# Patient Record
Sex: MALE | Race: WHITE | NOT HISPANIC OR LATINO | Employment: PART TIME | URBAN - METROPOLITAN AREA
[De-identification: names, ages, dates, MRNs, and addresses within clinical notes are randomized per-mention and may not be internally consistent; named-entity substitution may affect disease eponyms.]

---

## 2022-09-29 ENCOUNTER — OFFICE VISIT (OUTPATIENT)
Dept: FAMILY MEDICINE | Facility: CLINIC | Age: 42
End: 2022-09-29
Payer: MEDICARE

## 2022-09-29 VITALS — HEART RATE: 87 BPM | DIASTOLIC BLOOD PRESSURE: 78 MMHG | SYSTOLIC BLOOD PRESSURE: 133 MMHG | OXYGEN SATURATION: 99 %

## 2022-09-29 DIAGNOSIS — L03.115 CELLULITIS OF RIGHT LEG: Primary | ICD-10-CM

## 2022-09-29 PROCEDURE — 99213 PR OFFICE/OUTPT VISIT, EST, LEVL III, 20-29 MIN: ICD-10-PCS | Mod: S$GLB,,, | Performed by: INTERNAL MEDICINE

## 2022-09-29 PROCEDURE — 99213 OFFICE O/P EST LOW 20 MIN: CPT | Mod: S$GLB,,, | Performed by: INTERNAL MEDICINE

## 2022-09-29 RX ORDER — HYDROCHLOROTHIAZIDE 25 MG/1
TABLET ORAL
COMMUNITY
Start: 2022-07-07

## 2022-09-29 RX ORDER — ALLOPURINOL 300 MG/1
300 TABLET ORAL
COMMUNITY

## 2022-09-29 RX ORDER — MONTELUKAST SODIUM 10 MG/1
TABLET ORAL
COMMUNITY
Start: 2022-09-18

## 2022-09-29 RX ORDER — LEVOCETIRIZINE DIHYDROCHLORIDE 5 MG/1
5 TABLET, FILM COATED ORAL
COMMUNITY

## 2022-09-29 RX ORDER — OLMESARTAN MEDOXOMIL 40 MG/1
TABLET ORAL
COMMUNITY
Start: 2022-09-04

## 2022-09-29 RX ORDER — LINEZOLID 600 MG/1
600 TABLET, FILM COATED ORAL EVERY 12 HOURS
Qty: 20 TABLET | Refills: 0 | Status: SHIPPED | OUTPATIENT
Start: 2022-09-29 | End: 2022-10-09

## 2022-09-29 RX ORDER — LEVOTHYROXINE SODIUM 75 UG/1
75 TABLET ORAL
COMMUNITY

## 2022-09-29 RX ORDER — SEMAGLUTIDE 1.34 MG/ML
INJECTION, SOLUTION SUBCUTANEOUS
COMMUNITY
Start: 2022-09-02

## 2022-09-29 RX ORDER — ELECTROLYTES/DEXTROSE
5000 SOLUTION, ORAL ORAL
COMMUNITY

## 2022-09-29 NOTE — PROGRESS NOTES
Subjective:       Patient ID: Mateus Rivers is a 42 y.o. male.    Chief Complaint: Referral (Staphylococcus aureus infection)    Here for follow up from recent hospital stay.  He was there for refractory cellulitis to the right lower extremity.  He was initially on zosyn but failed to respond so zyvox was added.  He responded well to this along with increased elevation of the RLE.  He was able to be discharged home and has done well since discharge.  No fever or chills at home.  Swelling and redness continue to improve.  Still has a small open areas on the posterior side of the RLE but this is improving as well.      Has been 100% compliant with antibiotics, no adverse effects noted.  No acute issues at this time.  Review of Systems   Constitutional:  Negative for chills and fever.   Respiratory:  Negative for cough and shortness of breath.    Cardiovascular:  Negative for chest pain and palpitations.   Gastrointestinal:  Negative for diarrhea, nausea and vomiting.   Skin:  Positive for wound.   Neurological:  Positive for headaches (occasional, improving).     Objective:      Physical Exam  Vitals and nursing note reviewed.   Constitutional:       Appearance: Normal appearance. He is well-developed.   HENT:      Right Ear: External ear normal.      Left Ear: External ear normal.   Eyes:      General: No scleral icterus.     Conjunctiva/sclera: Conjunctivae normal.   Cardiovascular:      Rate and Rhythm: Normal rate and regular rhythm.      Heart sounds: Normal heart sounds. No murmur heard.  Pulmonary:      Effort: Pulmonary effort is normal. No respiratory distress.      Breath sounds: Normal breath sounds. No wheezing.   Abdominal:      General: Bowel sounds are normal. There is no distension.      Palpations: Abdomen is soft.      Tenderness: There is no abdominal tenderness. There is no guarding.   Skin:     General: Skin is warm and dry.      Findings: Lesion (wound to posterior RLE dressed) present. No rash.       Comments: Erythema improved, still trace-1+ edema RLE   Neurological:      Mental Status: He is alert and oriented to person, place, and time. Mental status is at baseline.   Psychiatric:         Mood and Affect: Mood normal.         Behavior: Behavior normal.       Assessment:       1. Cellulitis of right leg          Plan:       Problem List Items Addressed This Visit          ID    Cellulitis of right leg - Primary     Continues to do well on zyvox post hospital discharge.  Will extend therapy for another 10 days and then reassess.  Continue dermatology follow up and local care.           Relevant Medications    linezolid (ZYVOX) 600 mg Tab

## 2022-09-29 NOTE — ASSESSMENT & PLAN NOTE
Continues to do well on zyvox post hospital discharge.  Will extend therapy for another 10 days and then reassess.  Continue dermatology follow up and local care.

## 2022-10-03 ENCOUNTER — TELEPHONE (OUTPATIENT)
Dept: FAMILY MEDICINE | Facility: CLINIC | Age: 42
End: 2022-10-03
Payer: MEDICARE

## 2022-10-03 NOTE — TELEPHONE ENCOUNTER
----- Message from Jessica Tipton MA sent at 10/3/2022  7:41 AM CDT -----  Contact: pt    ----- Message -----  From: Natalya Skinner  Sent: 9/30/2022   2:06 PM CDT  To: Aleyda Fang Staff        Who Called:stephania Worley Left Message for Patient:  Does the patient know what this is regarding?:r/s appt   Would the patient rather a call back or a response via [facility]?   Best Call Back Number:.[phone]    Additional Information:

## 2022-10-12 ENCOUNTER — OFFICE VISIT (OUTPATIENT)
Dept: INFECTIOUS DISEASES | Facility: CLINIC | Age: 42
End: 2022-10-12
Payer: MEDICARE

## 2022-10-12 VITALS
HEART RATE: 95 BPM | SYSTOLIC BLOOD PRESSURE: 126 MMHG | BODY MASS INDEX: 38.36 KG/M2 | WEIGHT: 315 LBS | OXYGEN SATURATION: 98 % | RESPIRATION RATE: 18 BRPM | DIASTOLIC BLOOD PRESSURE: 63 MMHG | HEIGHT: 76 IN

## 2022-10-12 DIAGNOSIS — I87.2 VENOUS INSUFFICIENCY OF LOWER EXTREMITY, UNSPECIFIED LATERALITY: ICD-10-CM

## 2022-10-12 DIAGNOSIS — L03.115 CELLULITIS OF RIGHT LEG: Primary | ICD-10-CM

## 2022-10-12 PROCEDURE — 99213 OFFICE O/P EST LOW 20 MIN: CPT | Mod: S$GLB,,, | Performed by: NURSE PRACTITIONER

## 2022-10-12 PROCEDURE — 99213 PR OFFICE/OUTPT VISIT, EST, LEVL III, 20-29 MIN: ICD-10-PCS | Mod: S$GLB,,, | Performed by: NURSE PRACTITIONER

## 2022-10-12 RX ORDER — LINEZOLID 600 MG/1
600 TABLET, FILM COATED ORAL EVERY 12 HOURS
Qty: 8 TABLET | Refills: 0 | Status: SHIPPED | OUTPATIENT
Start: 2022-10-12 | End: 2022-10-16

## 2022-10-12 NOTE — PROGRESS NOTES
Infectious Diseases Clinic Note    Subjective:       Patient ID: Mateus Rivers is a 42 y.o. male     Chief Complaint: No chief complaint on file.        Mateus Rivers is a 42 y.o. male here today for 2 week f/u regarding cellulitis.  This is a new diagnosis to me.  Referral from   .   Primary care provider is Edson Negron MD .      Here for follow up from recent hospital stay. He is accompanied by his mother.  He was there for refractory cellulitis to the right lower extremity.  He was initially on zosyn but failed to respond so zyvox was added.  He responded well to this along with increased elevation of the RLE.  He was able to be discharged home and has done well since discharge. He saw Dr. Brett camargo two weeks ago. His zyvox was extended by 10 days. He has two days left of medication. He continues to have erythema of the LE.  No fever or chills at home.  Swelling continue to improve.  His small open areas on the posterior side of the RLE have scabbed over. He tells me he is not wearing compression hose.      Has been 100% compliant with antibiotics, no adverse effects noted.  No acute issues at this time.             Past Medical History:   Diagnosis Date    Cellulitis of right lower extremity     GERD (gastroesophageal reflux disease)     HLD (hyperlipidemia)     Staphylococcus aureus infection        Social History     Socioeconomic History    Marital status: Single   Tobacco Use    Smoking status: Never    Smokeless tobacco: Never   Substance and Sexual Activity    Alcohol use: Never    Drug use: Never         Current Outpatient Medications:     allopurinoL (ZYLOPRIM) 300 MG tablet, 300 mg., Disp: , Rfl:     biotin 5 mg Cap, 5,000 mcg., Disp: , Rfl:     calcium carbonate (TUMS) 300 mg (750 mg) Chew, 750 mg., Disp: , Rfl:     hydroCHLOROthiazide (HYDRODIURIL) 25 MG tablet, , Disp: , Rfl:     levocetirizine (XYZAL) 5 MG tablet, 5 mg., Disp: , Rfl:     levothyroxine (SYNTHROID) 75 MCG tablet, 75 mcg.,  Disp: , Rfl:     linezolid (ZYVOX) 600 mg Tab, Take 1 tablet (600 mg total) by mouth every 12 (twelve) hours. for 4 days, Disp: 8 tablet, Rfl: 0    montelukast (SINGULAIR) 10 mg tablet, , Disp: , Rfl:     olmesartan (BENICAR) 40 MG tablet, , Disp: , Rfl:     OZEMPIC 1 mg/dose (4 mg/3 mL), INJECT ONE MG SUBCUTANEOUSLY ONCE WEEKLY, Disp: , Rfl:     Review of Systems   Constitutional:  Negative for chills and fever.   Respiratory:  Negative for cough and shortness of breath.    Cardiovascular:  Positive for leg swelling (RLE). Negative for chest pain and palpitations.   Gastrointestinal:  Negative for diarrhea, nausea and vomiting.   Genitourinary:  Negative for difficulty urinating.   Skin:  Negative for wound.   Neurological:  Negative for headaches (occasional, improving).         Objective:      Vitals:    10/12/22 1310   BP: 126/63   Pulse: 95   Resp: 18     Physical Exam  Vitals and nursing note reviewed.   Constitutional:       Appearance: Normal appearance. He is well-developed.   HENT:      Right Ear: External ear normal.      Left Ear: External ear normal.   Eyes:      General: No scleral icterus.     Conjunctiva/sclera: Conjunctivae normal.   Cardiovascular:      Rate and Rhythm: Normal rate and regular rhythm.      Heart sounds: Normal heart sounds. No murmur heard.  Pulmonary:      Effort: Pulmonary effort is normal. No respiratory distress.      Breath sounds: Normal breath sounds. No wheezing.   Abdominal:      General: Bowel sounds are normal. There is no distension.      Palpations: Abdomen is soft.      Tenderness: There is no abdominal tenderness. There is no guarding.   Skin:     General: Skin is warm and dry.      Findings: No lesion or rash.             Comments: Erythema improved, still trace-1+ edema RLE   Neurological:      Mental Status: He is alert and oriented to person, place, and time. Mental status is at baseline.   Psychiatric:         Mood and Affect: Mood normal.         Behavior:  Behavior normal.           Assessment/Plan:       1. Cellulitis of right leg    2. Venous insufficiency of lower extremity, unspecified laterality      Problem List Items Addressed This Visit          ID    Cellulitis of right leg - Primary    Current Assessment & Plan     Cellulitis seems to be complicated by venous insufficiency as he is not wearing compression hose around the house. Would like for him to continue Zyvox until next week with dedication to wearing the compression hose.. his wound is scabbed over at this time, so no contraindication to compression.  add Compression stockings to lower extremity to promote venous return.          Relevant Medications    linezolid (ZYVOX) 600 mg Tab     Other Visit Diagnoses       Venous insufficiency of lower extremity, unspecified laterality               No visits with results within 1 Month(s) from this visit.   Latest known visit with results is:   No results found for any previous visit.      No results found in the last 30 days.      Duration of encounter: 15 minutes  This includes face-to-face time and non face-to-face time preparing to see the patient (eg, review of tests), obtaining and/or reviewing separately obtained history, documenting clinical information in the electronic or other health record, independently interpreting resultsand communicating results to the patient/family/caregiver, or care coordination.      All diagnostic data (labs/imaging) was reviewed with the patient and/or family member in the room.  All questions were answered to their liking. The patient and/or family member voiced understanding of all instructions provided. Expectations regarding follow up and treatment plan were voiced and confirmed prior to departure. The patient was given orders/instructions at the end of the visit for reference. They were instructed to notify my office if they have not been contacted for imaging/referrals/labs/results in 1-2 weeks. They voiced  understanding of all of the above.     Follow up:     There are no Patient Instructions on file for this visit.     Follow up in about 6 days (around 10/18/2022), or if symptoms worsen or fail to improve.

## 2022-10-18 ENCOUNTER — OFFICE VISIT (OUTPATIENT)
Dept: INFECTIOUS DISEASES | Facility: CLINIC | Age: 42
End: 2022-10-18
Payer: MEDICARE

## 2022-10-18 VITALS
WEIGHT: 315 LBS | BODY MASS INDEX: 38.36 KG/M2 | HEIGHT: 76 IN | HEART RATE: 105 BPM | SYSTOLIC BLOOD PRESSURE: 116 MMHG | TEMPERATURE: 98 F | DIASTOLIC BLOOD PRESSURE: 78 MMHG | OXYGEN SATURATION: 95 %

## 2022-10-18 DIAGNOSIS — L03.115 CELLULITIS OF RIGHT LEG: Primary | ICD-10-CM

## 2022-10-18 PROCEDURE — 99213 OFFICE O/P EST LOW 20 MIN: CPT | Mod: S$GLB,,, | Performed by: NURSE PRACTITIONER

## 2022-10-18 PROCEDURE — 99213 PR OFFICE/OUTPT VISIT, EST, LEVL III, 20-29 MIN: ICD-10-PCS | Mod: S$GLB,,, | Performed by: NURSE PRACTITIONER

## 2022-10-18 RX ORDER — OMEGA-3 FATTY ACIDS 1000 MG
1000 CAPSULE ORAL DAILY
COMMUNITY

## 2022-10-18 RX ORDER — GENTAMICIN SULFATE 1 MG/G
1 OINTMENT TOPICAL DAILY
COMMUNITY

## 2022-10-18 RX ORDER — NAPROXEN 500 MG/1
500 TABLET ORAL
COMMUNITY

## 2022-10-18 RX ORDER — DOXYCYCLINE 100 MG/1
100 CAPSULE ORAL DAILY
COMMUNITY
Start: 2022-09-02

## 2022-10-18 RX ORDER — RISANKIZUMAB-RZAA 150 MG/ML
150 INJECTION SUBCUTANEOUS
COMMUNITY
Start: 2022-10-14

## 2022-10-18 RX ORDER — ATORVASTATIN CALCIUM 80 MG/1
80 TABLET, FILM COATED ORAL DAILY
COMMUNITY

## 2022-10-18 RX ORDER — LINEZOLID 600 MG/1
600 TABLET, FILM COATED ORAL 2 TIMES DAILY
COMMUNITY
Start: 2022-09-20

## 2022-10-18 RX ORDER — PANTOPRAZOLE SODIUM 40 MG/1
40 TABLET, DELAYED RELEASE ORAL DAILY
COMMUNITY
Start: 2022-10-09

## 2022-10-18 NOTE — ASSESSMENT & PLAN NOTE
Erythema, warmth, wound all improved with compression hose. No active signs of infection at this time.        Patient can discontinue antibiotics at this time.  Advised to continue wearing compression hose given his venous insufficiency.  Follow-up as needed        PLAN  1. Stop antibiotics. His mother voiced understanding.   2. F/u PRN

## 2022-10-18 NOTE — PROGRESS NOTES
Infectious Diseases Clinic Note    Subjective:       Patient ID: Mateus Rivers is a 42 y.o. male     Chief Complaint: Follow-up (One week follow up)        Mateus Rivers is a 42 y.o. male here today for 2 week f/u regarding cellulitis.  Referral from   .   Primary care provider is Edson Negron MD .      Here for 1 week follow up  He is accompanied by his mother. His antibiotics were continued during his last encounter with instruction to wear compression hose.  Since his last visit, the patient has noted closure of his wounds. No longer has erythema. No fever or chills at home.  Swelling has resolved.       He feels well overall. No acute issues at this time.             Past Medical History:   Diagnosis Date    Cellulitis of right lower extremity     GERD (gastroesophageal reflux disease)     HLD (hyperlipidemia)     Staphylococcus aureus infection        Social History     Socioeconomic History    Marital status: Single   Tobacco Use    Smoking status: Never    Smokeless tobacco: Never   Substance and Sexual Activity    Alcohol use: Never    Drug use: Never         Current Outpatient Medications:     allopurinoL (ZYLOPRIM) 300 MG tablet, 300 mg., Disp: , Rfl:     atorvastatin (LIPITOR) 80 MG tablet, Take 80 mg by mouth once daily., Disp: , Rfl:     biotin 5 mg Cap, 5,000 mcg., Disp: , Rfl:     calcium carbonate (TUMS) 300 mg (750 mg) Chew, 750 mg., Disp: , Rfl:     doxycycline (VIBRAMYCIN) 100 MG Cap, Take 100 mg by mouth once daily., Disp: , Rfl:     gentamicin (GARAMYCIN) 0.1 % ointment, Apply 1 applicator topically once daily., Disp: , Rfl:     hydroCHLOROthiazide (HYDRODIURIL) 25 MG tablet, , Disp: , Rfl:     Lactobacillus rhamnosus GG ([facility] K[employer] PROBIOT[employer] ORAL), Take 1 capsule by mouth once daily., Disp: , Rfl:     levocetirizine (XYZAL) 5 MG tablet, 5 mg., Disp: , Rfl:     levothyroxine (SYNTHROID) 75 MCG tablet, 75 mcg., Disp: , Rfl:     linezolid (ZYVOX) 600 mg Tab, Take 600 mg by mouth 2  (two) times daily., Disp: , Rfl:     montelukast (SINGULAIR) 10 mg tablet, , Disp: , Rfl:     naproxen (NAPROSYN) 500 MG tablet, Take 500 mg by mouth as needed., Disp: , Rfl:     olmesartan (BENICAR) 40 MG tablet, , Disp: , Rfl:     omega-3 fatty acids 1,000 mg Cap, Take 1,000 mg by mouth once daily., Disp: , Rfl:     OZEMPIC 1 mg/dose (4 mg/3 mL), INJECT ONE MG SUBCUTANEOUSLY ONCE WEEKLY, Disp: , Rfl:     pantoprazole (PROTONIX) 40 MG tablet, Take 40 mg by mouth once daily., Disp: , Rfl:     SKYRIZI 150 mg/mL PnIj, Inject 150 mg into the skin every 3 (three) months.  Injection every 2 months, Disp: , Rfl:     Review of Systems   Constitutional:  Negative for chills and fever.   Respiratory:  Negative for cough and shortness of breath.    Cardiovascular:  Negative for chest pain, palpitations and leg swelling (RLE).   Gastrointestinal:  Negative for diarrhea, nausea and vomiting.   Genitourinary:  Negative for difficulty urinating.   Skin:  Negative for wound.   Neurological:  Negative for headaches (occasional, improving).         Objective:      Vitals:    10/18/22 1329   BP: 116/78   Pulse: 105   Temp: 98.1 °F (36.7 °C)     Physical Exam  Vitals and nursing note reviewed.   Constitutional:       Appearance: Normal appearance. He is well-developed.   HENT:      Right Ear: External ear normal.      Left Ear: External ear normal.   Eyes:      General: No scleral icterus.     Conjunctiva/sclera: Conjunctivae normal.   Cardiovascular:      Rate and Rhythm: Normal rate and regular rhythm.      Heart sounds: Normal heart sounds. No murmur heard.  Pulmonary:      Effort: Pulmonary effort is normal. No respiratory distress.      Breath sounds: Normal breath sounds. No wheezing.   Abdominal:      General: Bowel sounds are normal. There is no distension.      Palpations: Abdomen is soft.      Tenderness: There is no abdominal tenderness. There is no guarding.   Skin:     General: Skin is warm and dry.      Findings: No  lesion or rash.             Comments: Erythema improved, still trace-1+ edema RLE   Neurological:      Mental Status: He is alert and oriented to person, place, and time. Mental status is at baseline.   Psychiatric:         Mood and Affect: Mood normal.         Behavior: Behavior normal.           Assessment/Plan:       1. Cellulitis of right leg      Problem List Items Addressed This Visit          ID    Cellulitis of right leg - Primary    Current Assessment & Plan     Erythema, warmth, wound all improved with compression hose. No active signs of infection at this time.        Patient can discontinue antibiotics at this time.  Advised to continue wearing compression hose given his venous insufficiency.  Follow-up as needed        PLAN  1. Stop antibiotics. His mother voiced understanding.   2. F/u PRN            No visits with results within 1 Month(s) from this visit.   Latest known visit with results is:   No results found for any previous visit.      No results found in the last 30 days.      Duration of encounter: 10 minutes  This includes face-to-face time and non face-to-face time preparing to see the patient (eg, review of tests), obtaining and/or reviewing separately obtained history, documenting clinical information in the electronic or other health record, independently interpreting resultsand communicating results to the patient/family/caregiver, or care coordination.      All diagnostic data (labs/imaging) was reviewed with the patient and/or family member in the room.  All questions were answered to their liking. The patient and/or family member voiced understanding of all instructions provided. Expectations regarding follow up and treatment plan were voiced and confirmed prior to departure. The patient was given orders/instructions at the end of the visit for reference. They were instructed to notify my office if they have not been contacted for imaging/referrals/labs/results in 1-2 weeks. They  voiced understanding of all of the above.     Follow up:     There are no Patient Instructions on file for this visit.     Follow up if symptoms worsen or fail to improve.

## 2023-05-22 ENCOUNTER — TELEPHONE (OUTPATIENT)
Dept: FAMILY MEDICINE | Facility: CLINIC | Age: 43
End: 2023-05-22
Payer: MEDICARE

## 2023-05-22 NOTE — TELEPHONE ENCOUNTER
Apt 6/13         Leoncio Maynard MD   Sent: Mon May 22, 2023 10:58 AM   To: Jessica Fry    OK please schedule with me or Santiago.  Thanks.

## 2023-06-12 NOTE — ASSESSMENT & PLAN NOTE
Sensistive to Rifampin, Tetracycline, daptomycin, clinda, zyvox, and vancomycin.        PLAN    1. Extend doxycycline for 2 weeks.   2. See AVS for MRSA decolonization

## 2023-06-12 NOTE — PROGRESS NOTES
"Infectious Diseases Clinic Note    Subjective:       Patient ID: Mateus Rivers is a 42 y.o. male     Chief Complaint: Referral (+MRSA/Psoriasis)        Mateus Rivers is a 42 y.o. male here today for .  This is a new diagnosis to me.  Referral from   .   Primary care provider is Edson Negron MD .      Here for MRSA.  Known to our clinic in the past for similar issue of RLE.  He is accompanied by his mother.  She tells me that he was positive for MRSA of both the right and left lower extremity posterior to the knee.  He has been on doxycycline for the past 2 weeks.  Reports some areas of improvement, however, he continues to have a erythema.  She also tells me that he has bad psoriasis involving the posterior aspect of both knees, bilateral inguinal regions, bilateral pannus, and bilateral intertriginous regions of the breast.  He denies fever, chills, body aches, myalgias.  He is currently applying gentamicin ointment daily to the back of his legs. He is also using "Zoryve" to the head and back.                  Past Medical History:   Diagnosis Date    Cellulitis of right lower extremity     GERD (gastroesophageal reflux disease)     HLD (hyperlipidemia)     Staphylococcus aureus infection        Social History     Socioeconomic History    Marital status: Single   Tobacco Use    Smoking status: Never    Smokeless tobacco: Never   Substance and Sexual Activity    Alcohol use: Never    Drug use: Never         Current Outpatient Medications:     allopurinoL (ZYLOPRIM) 300 MG tablet, 300 mg., Disp: , Rfl:     amoxicillin (AMOXIL) 875 MG tablet, Take 1 tablet (875 mg total) by mouth every 12 (twelve) hours. for 14 days, Disp: 28 tablet, Rfl: 0    atorvastatin (LIPITOR) 80 MG tablet, Take 80 mg by mouth once daily., Disp: , Rfl:     biotin 5 mg Cap, 5,000 mcg., Disp: , Rfl:     calcium carbonate (TUMS) 300 mg (750 mg) Chew, 750 mg., Disp: , Rfl:     doxycycline (VIBRA-TABS) 100 MG tablet, Take 1 tablet (100 mg " total) by mouth 2 (two) times daily. for 7 days, Disp: 14 tablet, Rfl: 0    doxycycline (VIBRAMYCIN) 100 MG Cap, Take 100 mg by mouth once daily., Disp: , Rfl:     fluconazole (DIFLUCAN) 100 MG tablet, Take 1 tablet (100 mg total) by mouth once daily. for 14 days, Disp: 14 tablet, Rfl: 0    gentamicin (GARAMYCIN) 0.1 % ointment, Apply 1 applicator topically once daily., Disp: , Rfl:     hydroCHLOROthiazide (HYDRODIURIL) 25 MG tablet, , Disp: , Rfl:     Lactobacillus rhamnosus GG (CULTURELLE KIDS PROBIOTICS ORAL), Take 1 capsule by mouth once daily., Disp: , Rfl:     levocetirizine (XYZAL) 5 MG tablet, 5 mg., Disp: , Rfl:     levothyroxine (SYNTHROID) 75 MCG tablet, 75 mcg., Disp: , Rfl:     linezolid (ZYVOX) 600 mg Tab, Take 600 mg by mouth 2 (two) times daily., Disp: , Rfl:     montelukast (SINGULAIR) 10 mg tablet, , Disp: , Rfl:     mupirocin (BACTROBAN) 2 % ointment, by Nasal route nightly., Disp: 22 g, Rfl: 2    naproxen (NAPROSYN) 500 MG tablet, Take 500 mg by mouth as needed., Disp: , Rfl:     olmesartan (BENICAR) 40 MG tablet, , Disp: , Rfl:     omega-3 fatty acids 1,000 mg Cap, Take 1,000 mg by mouth once daily., Disp: , Rfl:     OZEMPIC 1 mg/dose (4 mg/3 mL), INJECT ONE MG SUBCUTANEOUSLY ONCE WEEKLY, Disp: , Rfl:     pantoprazole (PROTONIX) 40 MG tablet, Take 40 mg by mouth once daily., Disp: , Rfl:     SKYRIZI 150 mg/mL PnIj, Inject 150 mg into the skin every 3 (three) months.  Injection every 2 months, Disp: , Rfl:     Review of Systems        Objective:      Vitals:    06/13/23 1306   BP: 98/71   Pulse: 81     Physical Exam  Vitals and nursing note reviewed.   Constitutional:       Appearance: Normal appearance. He is well-developed.   HENT:      Right Ear: External ear normal.      Left Ear: External ear normal.   Eyes:      General: No scleral icterus.     Conjunctiva/sclera: Conjunctivae normal.   Cardiovascular:      Rate and Rhythm: Normal rate and regular rhythm.      Heart sounds: Normal heart  sounds. No murmur heard.  Pulmonary:      Effort: Pulmonary effort is normal. No respiratory distress.      Breath sounds: Normal breath sounds. No wheezing.   Abdominal:      General: Bowel sounds are normal. There is no distension.      Palpations: Abdomen is soft.      Tenderness: There is no abdominal tenderness. There is no guarding.   Skin:     General: Skin is warm and dry.      Coloration: Skin is not jaundiced.      Findings: Rash present. No abscess, lesion or wound. Rash is not pustular, urticarial or vesicular.          Neurological:      Mental Status: He is alert and oriented to person, place, and time. Mental status is at baseline.   Psychiatric:         Mood and Affect: Mood normal.         Behavior: Behavior normal.           Assessment/Plan:       1. Cellulitis, unspecified cellulitis site    2. Staphylococcus aureus infection    3. Candidal intertrigo    4. Psoriasis vulgaris      Problem List Items Addressed This Visit          ID    Staphylococcus aureus infection    Current Assessment & Plan       Sensistive to Rifampin, Tetracycline, daptomycin, clinda, zyvox, and vancomycin.        PLAN    1. Extend doxycycline for 2 weeks.   2. See AVS for MRSA decolonization         Relevant Medications    amoxicillin (AMOXIL) 875 MG tablet    doxycycline (VIBRA-TABS) 100 MG tablet    mupirocin (BACTROBAN) 2 % ointment     Other Visit Diagnoses       Cellulitis, unspecified cellulitis site    -  Primary    Cover Strep w/ Amoxil x 14 days.     Relevant Medications    amoxicillin (AMOXIL) 875 MG tablet    doxycycline (VIBRA-TABS) 100 MG tablet    Candidal intertrigo        Fluconazole 100mg dakly x 14 days while on ABX.     Relevant Medications    fluconazole (DIFLUCAN) 100 MG tablet    Psoriasis vulgaris        Pt anticipating MTX treatment in the near future. He may need extended Abx if this is the route they choose.            No visits with results within 1 Month(s) from this visit.   Latest known visit  with results is:   No results found for any previous visit.      No results found in the last 30 days.      Duration of encounter: minutes  This includes face-to-face time and non face-to-face time preparing to see the patient (eg, review of tests), obtaining and/or reviewing separately obtained history, documenting clinical information in the electronic or other health record, independently interpreting resultsand communicating results to the patient/family/caregiver, or care coordination.      All diagnostic data (labs/imaging) was reviewed with the patient and/or family member in the room.  All questions were answered to their liking. The patient and/or family member voiced understanding of all instructions provided. Expectations regarding follow up and treatment plan were voiced and confirmed prior to departure. The patient was given orders/instructions at the end of the visit for reference. They were instructed to notify my office if they have not been contacted for imaging/referrals/labs/results in 1-2 weeks. They voiced understanding of all of the above.     Follow up:     Patient Instructions   MRSA EDUCATION       What are symptoms of MRSA Infection?   The symptoms of a MRSA infection depend on the part of the body that is infected. For example, people with MRSA skin infections often can get swelling, warmth, redness, and pain in infected skin. In most cases it is hard to tell if an infection is due to MRSA or another type of bacteria without laboratory tests that your doctor can order. Some MRSA skin infections can have a fairly typical appearance and can be confused with a spider bite. However, unless you actually see the spider, the irritation is likely not a spider bite.  Most S. aureus skin infections, including MRSA, appear as a bump or infected area on the skin that might be:  red   swollen   painful   warm to the touch   full of pus or other drainage   accompanied by a fever    HOW IS MRSA  "SPREAD?  You can be "colonized" with MRSA, meaning that you carry the bacteria on your skin or in your nose but you have no signs or symptoms of the illness. You can become colonized with MRSA in a variety of ways:    ?By touching the skin of another person who is colonized with MRSA  ?By touching a contaminated surface (such as a countertop, door handle, or phone)    MRSA RISK FACTORS  Anyone can become colonized and then infected with MRSA, although certain people are at a higher risk.    Hospital care -- Risk factors for becoming infected with hospital-associated MRSA include the following:    ?Having a surgical wound and/or intravenous (IV) line  ?Being hospitalized for a prolonged period of time  ?Recent use of antibiotics  ?Having a weakened immune system due to a medical condition or its treatment (eg, receiving medications that weaken the immune system)  ?Being in close proximity to other patients, family members, or health care workers who are colonized with MRSA    In hospitals and other long-term health care facilities, MRSA can be spread from one patient to another on the hands of health care workers. Hands may become contaminated with MRSA when health care workers touch a patient's skin, wounds, wound dressings, or devices, such as IV tubing.    You can develop an infection from MRSA if your skin is colonized and the bacteria enter an opening (eg, a cut, scrape, or wound) in the skin.    Community-associated MRSA -- You can  MRSA outside the hospital, especially if you:    ?Have skin trauma (eg, "turf burns," cuts, or sores)  ?Are an athlete  ?Shave or wax to remove body hair, particularly of the armpits and groin  ?Have tattoos or body piercing  ?Have physical contact with a person who has a draining cut or sore or is a carrier of MRSA  ?Share personal items or equipment that is not cleaned or laundered between users (such as towels or protective sport pads)    Community-associated MRSA " infections may occur more commonly in certain populations, such as  centers, prisons, in the , or in athletes who play on a team. Spread of MRSA within households is common.    Prevention in the community -- The best way to prevent and control MRSA in the community is not clear. The [place] Centers for Disease Control and Prevention has made the following recommendations [3]    ?Keep hands clean by washing thoroughly with soap and water. Hands should be wet with water and plain soap and be rubbed together for 15 to 30 seconds. Special attention should be paid to the fingernails, between the fingers, and the wrists. Hands should be rinsed thoroughly and dried with a single-use towel (eg, paper towels).  ?Alcohol-based hand sanitizers are a good alternative for disinfecting hands if a sink is not available. Hand sanitizers should be rubbed over the entire surface of hands, fingers, and wrists until dry and may be used several times. Hand sanitizers are available as a liquid or wipe in small, portable sizes that are easy to carry in a pocket or handbag. When a sink is available, visibly soiled hands should be washed with soap and water.  ?Keep cuts and scrapes clean, dry, and covered with a bandage until healed.  ?Avoid touching other people's wounds or bandages.  ?Avoid sharing personal items such as towels, washcloths, razors, clothing, or uniforms.  Other items that should not be shared include brushes, maravilla, and makeup.  ?Students who participate in team sports should shower after every athletic activity using soap and clean towels. Athletes with skin infections should receive prompt treatment and should not compete when they have draining or active skin infections.  ?People who use exercise machines at sports clubs or schools should be sure to wipe down the equipment, including the hand , with an alcohol-based solution after using it.        DECOLONIZATION:      Nasal decolonization with  mupirocin ointment (2%) applied to nares twice daily for 1 month, and    ?Topical body decolonization (BOTH of the following):  Chlorhexidine gluconate (2% or 4% solution): daily washes and NONSCENTED DIAL SOAP              How can I clean and disinfect surfaces to prevent MRSA infection?      or detergents are products that remove soil, dirt, dust, organic matter, and germs (like bacteria, viruses, and fungi). They lift dirt and germs off surfaces so they can be rinsed away with water. Cleaning with a detergent is necessary to remove dirt that can prevent disinfectants from working. Some disinfectants have a cleaning agent mixed in, check the label to know which product you have.   Disinfectants are chemical products that are used to kill germs in healthcare settings. Disinfectants effective against Staphylococcus aureus, or staph, are also effective against MRSA. The disinfectant's label should have a list of germs that the product can kill, along with an Environmental Protection Agency (EPA) registration number. These products are also sold at grocery and other retail stores and may be helpful when someone has an infected wound.    What should I clean to prevent MRSA from spreading?     When cleaning and disinfecting, focus on surfaces that frequently contact people's bare skin like desks, chairs, benches, gym equipment, lockers, faucets, light switches and remote controls. In particular, clean any surfaces that could come into contact with uncovered wounds, cuts, or boils. In addition to cleaning surfaces, frequently cleaning hands and keeping wounds covered keeps MRSA from spreading.  Large surfaces, such as floors and walls, have not been associated with the spread of staph and MRSA. There is no evidence that spraying or fogging rooms or surfaces with disinfectants will prevent MRSA infections more effectively than the targeted approach of cleaning frequently touched surfaces and surfaces that have  been exposed to open wounds.    What if I see these symptoms?     You cannot tell by looking at the skin if it's a staph infection (including MRSA).  Getting medical care early makes it less likely that the infection will become serious.  If you or someone in your family experiences the signs and symptoms of MRSA:  Contact your healthcare provider, especially if the symptoms are accompanied by a fever.   Do not pick at or pop the sore.   Cover the area with clean, dry bandages until you can see a healthcare provider.   Clean your hands often.          Follow up in about 2 weeks (around 6/27/2023), or if symptoms worsen or fail to improve.

## 2023-06-13 ENCOUNTER — OFFICE VISIT (OUTPATIENT)
Dept: INFECTIOUS DISEASES | Facility: CLINIC | Age: 43
End: 2023-06-13
Payer: MEDICARE

## 2023-06-13 VITALS — SYSTOLIC BLOOD PRESSURE: 98 MMHG | DIASTOLIC BLOOD PRESSURE: 71 MMHG | HEART RATE: 81 BPM | OXYGEN SATURATION: 98 %

## 2023-06-13 DIAGNOSIS — L03.90 CELLULITIS, UNSPECIFIED CELLULITIS SITE: Primary | ICD-10-CM

## 2023-06-13 DIAGNOSIS — B37.2 CANDIDAL INTERTRIGO: ICD-10-CM

## 2023-06-13 DIAGNOSIS — L40.0 PSORIASIS VULGARIS: ICD-10-CM

## 2023-06-13 DIAGNOSIS — A49.01 STAPHYLOCOCCUS AUREUS INFECTION: ICD-10-CM

## 2023-06-13 PROCEDURE — 99214 PR OFFICE/OUTPT VISIT, EST, LEVL IV, 30-39 MIN: ICD-10-PCS | Mod: S$GLB,,, | Performed by: NURSE PRACTITIONER

## 2023-06-13 PROCEDURE — 99214 OFFICE O/P EST MOD 30 MIN: CPT | Mod: S$GLB,,, | Performed by: NURSE PRACTITIONER

## 2023-06-13 RX ORDER — DOXYCYCLINE HYCLATE 100 MG
100 TABLET ORAL 2 TIMES DAILY
Qty: 14 TABLET | Refills: 0 | Status: SHIPPED | OUTPATIENT
Start: 2023-06-13 | End: 2023-06-20

## 2023-06-13 RX ORDER — FLUCONAZOLE 100 MG/1
100 TABLET ORAL DAILY
Qty: 14 TABLET | Refills: 0 | Status: SHIPPED | OUTPATIENT
Start: 2023-06-13 | End: 2023-06-27

## 2023-06-13 RX ORDER — AMOXICILLIN 875 MG/1
875 TABLET, FILM COATED ORAL EVERY 12 HOURS
Qty: 28 TABLET | Refills: 0 | Status: SHIPPED | OUTPATIENT
Start: 2023-06-13 | End: 2023-06-27

## 2023-06-13 RX ORDER — MUPIROCIN 20 MG/G
OINTMENT TOPICAL NIGHTLY
Qty: 22 G | Refills: 2 | Status: SHIPPED | OUTPATIENT
Start: 2023-06-13 | End: 2023-07-13

## 2023-06-13 NOTE — PATIENT INSTRUCTIONS
"MRSA EDUCATION       What are symptoms of MRSA Infection?   The symptoms of a MRSA infection depend on the part of the body that is infected. For example, people with MRSA skin infections often can get swelling, warmth, redness, and pain in infected skin. In most cases it is hard to tell if an infection is due to MRSA or another type of bacteria without laboratory tests that your doctor can order. Some MRSA skin infections can have a fairly typical appearance and can be confused with a spider bite. However, unless you actually see the spider, the irritation is likely not a spider bite.  Most S. aureus skin infections, including MRSA, appear as a bump or infected area on the skin that might be:  red   swollen   painful   warm to the touch   full of pus or other drainage   accompanied by a fever    HOW IS MRSA SPREAD?  You can be "colonized" with MRSA, meaning that you carry the bacteria on your skin or in your nose but you have no signs or symptoms of the illness. You can become colonized with MRSA in a variety of ways:    ?By touching the skin of another person who is colonized with MRSA  ?By touching a contaminated surface (such as a countertop, door handle, or phone)    MRSA RISK FACTORS  Anyone can become colonized and then infected with MRSA, although certain people are at a higher risk.    Hospital care -- Risk factors for becoming infected with hospital-associated MRSA include the following:    ?Having a surgical wound and/or intravenous (IV) line  ?Being hospitalized for a prolonged period of time  ?Recent use of antibiotics  ?Having a weakened immune system due to a medical condition or its treatment (eg, receiving medications that weaken the immune system)  ?Being in close proximity to other patients, family members, or health care workers who are colonized with MRSA    In hospitals and other long-term health care facilities, MRSA can be spread from one patient to another on the hands of health care " "workers. Hands may become contaminated with MRSA when health care workers touch a patient's skin, wounds, wound dressings, or devices, such as IV tubing.    You can develop an infection from MRSA if your skin is colonized and the bacteria enter an opening (eg, a cut, scrape, or wound) in the skin.    Community-associated MRSA -- You can  MRSA outside the hospital, especially if you:    ?Have skin trauma (eg, "turf burns," cuts, or sores)  ?Are an athlete  ?Shave or wax to remove body hair, particularly of the armpits and groin  ?Have tattoos or body piercing  ?Have physical contact with a person who has a draining cut or sore or is a carrier of MRSA  ?Share personal items or equipment that is not cleaned or laundered between users (such as towels or protective sport pads)    Community-associated MRSA infections may occur more commonly in certain populations, such as  centers, prisons, in the , or in athletes who play on a team. Spread of MRSA within households is common.    Prevention in the community -- The best way to prevent and control MRSA in the community is not clear. The [place] Centers for Disease Control and Prevention has made the following recommendations [3]    ?Keep hands clean by washing thoroughly with soap and water. Hands should be wet with water and plain soap and be rubbed together for 15 to 30 seconds. Special attention should be paid to the fingernails, between the fingers, and the wrists. Hands should be rinsed thoroughly and dried with a single-use towel (eg, paper towels).  ?Alcohol-based hand sanitizers are a good alternative for disinfecting hands if a sink is not available. Hand sanitizers should be rubbed over the entire surface of hands, fingers, and wrists until dry and may be used several times. Hand sanitizers are available as a liquid or wipe in small, portable sizes that are easy to carry in a pocket or handbag. When a sink is available, visibly soiled " hands should be washed with soap and water.  ?Keep cuts and scrapes clean, dry, and covered with a bandage until healed.  ?Avoid touching other people's wounds or bandages.  ?Avoid sharing personal items such as towels, washcloths, razors, clothing, or uniforms.  Other items that should not be shared include brushes, maravilla, and makeup.  ?Students who participate in team sports should shower after every athletic activity using soap and clean towels. Athletes with skin infections should receive prompt treatment and should not compete when they have draining or active skin infections.  ?People who use exercise machines at sports clubs or schools should be sure to wipe down the equipment, including the hand , with an alcohol-based solution after using it.        DECOLONIZATION:      Nasal decolonization with mupirocin ointment (2%) applied to nares twice daily for 1 month, and    ?Topical body decolonization (BOTH of the following):  Chlorhexidine gluconate (2% or 4% solution): daily washes and NONSCENTED DIAL SOAP              How can I clean and disinfect surfaces to prevent MRSA infection?      or detergents are products that remove soil, dirt, dust, organic matter, and germs (like bacteria, viruses, and fungi). They lift dirt and germs off surfaces so they can be rinsed away with water. Cleaning with a detergent is necessary to remove dirt that can prevent disinfectants from working. Some disinfectants have a cleaning agent mixed in, check the label to know which product you have.   Disinfectants are chemical products that are used to kill germs in healthcare settings. Disinfectants effective against Staphylococcus aureus, or staph, are also effective against MRSA. The disinfectant's label should have a list of germs that the product can kill, along with an Environmental Protection Agency (EPA) registration number. These products are also sold at grocery and other retail stores and may be helpful when  someone has an infected wound.    What should I clean to prevent MRSA from spreading?     When cleaning and disinfecting, focus on surfaces that frequently contact people's bare skin like desks, chairs, benches, gym equipment, lockers, faucets, light switches and remote controls. In particular, clean any surfaces that could come into contact with uncovered wounds, cuts, or boils. In addition to cleaning surfaces, frequently cleaning hands and keeping wounds covered keeps MRSA from spreading.  Large surfaces, such as floors and walls, have not been associated with the spread of staph and MRSA. There is no evidence that spraying or fogging rooms or surfaces with disinfectants will prevent MRSA infections more effectively than the targeted approach of cleaning frequently touched surfaces and surfaces that have been exposed to open wounds.    What if I see these symptoms?     You cannot tell by looking at the skin if it's a staph infection (including MRSA).  Getting medical care early makes it less likely that the infection will become serious.  If you or someone in your family experiences the signs and symptoms of MRSA:  Contact your healthcare provider, especially if the symptoms are accompanied by a fever.   Do not pick at or pop the sore.   Cover the area with clean, dry bandages until you can see a healthcare provider.   Clean your hands often.